# Patient Record
Sex: FEMALE | Race: WHITE | NOT HISPANIC OR LATINO | Employment: UNEMPLOYED | URBAN - METROPOLITAN AREA
[De-identification: names, ages, dates, MRNs, and addresses within clinical notes are randomized per-mention and may not be internally consistent; named-entity substitution may affect disease eponyms.]

---

## 2021-12-17 DIAGNOSIS — N90.89 VULVAR LESION: Primary | ICD-10-CM

## 2022-01-03 ENCOUNTER — OFFICE VISIT (OUTPATIENT)
Dept: OBSTETRICS AND GYNECOLOGY | Facility: CLINIC | Age: 67
End: 2022-01-03
Payer: MEDICARE

## 2022-01-03 VITALS
WEIGHT: 198.19 LBS | BODY MASS INDEX: 30.04 KG/M2 | SYSTOLIC BLOOD PRESSURE: 106 MMHG | HEART RATE: 98 BPM | HEIGHT: 68 IN | DIASTOLIC BLOOD PRESSURE: 69 MMHG

## 2022-01-03 DIAGNOSIS — L72.3 SEBACEOUS CYST: Primary | ICD-10-CM

## 2022-01-03 DIAGNOSIS — N90.89 VULVAR LESION: ICD-10-CM

## 2022-01-03 PROCEDURE — 99203 PR OFFICE/OUTPT VISIT, NEW, LEVL III, 30-44 MIN: ICD-10-PCS | Mod: 25,S$GLB,, | Performed by: NURSE PRACTITIONER

## 2022-01-03 PROCEDURE — 99203 OFFICE O/P NEW LOW 30 MIN: CPT | Mod: 25,S$GLB,, | Performed by: NURSE PRACTITIONER

## 2022-01-03 PROCEDURE — 10060 INCISION AND DRAINAGE: ICD-10-PCS | Mod: S$GLB,,, | Performed by: NURSE PRACTITIONER

## 2022-01-03 PROCEDURE — 10060 I&D ABSCESS SIMPLE/SINGLE: CPT | Mod: S$GLB,,, | Performed by: NURSE PRACTITIONER

## 2022-01-03 RX ORDER — METOPROLOL SUCCINATE 50 MG/1
50 TABLET, EXTENDED RELEASE ORAL DAILY
COMMUNITY
Start: 2021-11-18

## 2022-01-03 RX ORDER — HYDROCHLOROTHIAZIDE 25 MG/1
25 TABLET ORAL DAILY
COMMUNITY
Start: 2021-12-13

## 2022-01-03 RX ORDER — PANTOPRAZOLE SODIUM 40 MG/1
40 TABLET, DELAYED RELEASE ORAL EVERY MORNING
COMMUNITY
Start: 2021-12-28

## 2022-01-03 RX ORDER — CLOMIPRAMINE HYDROCHLORIDE 25 MG/1
25 CAPSULE ORAL DAILY
COMMUNITY
Start: 2021-11-29

## 2022-01-03 RX ORDER — LORAZEPAM 1 MG/1
1 TABLET ORAL 3 TIMES DAILY PRN
COMMUNITY
Start: 2021-12-09

## 2022-01-03 RX ORDER — LOSARTAN POTASSIUM 100 MG/1
100 TABLET ORAL DAILY
COMMUNITY
Start: 2021-11-05

## 2022-01-03 RX ORDER — CEPHALEXIN 500 MG/1
CAPSULE ORAL
COMMUNITY
Start: 2021-12-13

## 2022-01-03 RX ORDER — DIPHENOXYLATE HYDROCHLORIDE AND ATROPINE SULFATE 2.5; .025 MG/1; MG/1
TABLET ORAL
COMMUNITY
Start: 2021-11-19

## 2022-01-03 RX ORDER — ESTRADIOL CYPIONATE 5 MG/ML
INJECTION INTRAMUSCULAR
COMMUNITY
Start: 2021-10-11

## 2022-01-03 RX ORDER — ATORVASTATIN CALCIUM 20 MG/1
TABLET, FILM COATED ORAL
COMMUNITY
Start: 2021-10-05

## 2022-01-03 NOTE — PROGRESS NOTES
"  Subjective:       Patient ID: Temitope Deluna is a 66 y.o. female.    Chief Complaint:  Cyst      History of Present Illness   Presents as a referral from PCP for cyst on vaginal lesion. Patient states "bump" on vagina for the last few years. States non painful and doesn't interfere with intercourse.   No LMP recorded. Patient has had a hysterectomy.      OB History        0    Para   0    Term   0       0    AB   0    Living   0       SAB   0    IAB   0    Ectopic   0    Multiple   0    Live Births   0                  Review of Systems  Review of Systems   Constitutional: Negative for chills and fever.   Respiratory: Negative for shortness of breath.    Cardiovascular: Negative for chest pain.   Gastrointestinal: Negative for abdominal pain, blood in stool, constipation, diarrhea, nausea, vomiting and reflux.   Genitourinary: Positive for genital sores. Negative for dysmenorrhea, dyspareunia, dysuria, hematuria, hot flashes, menorrhagia, menstrual problem, pelvic pain, vaginal bleeding, vaginal discharge, postcoital bleeding and vaginal dryness.   Musculoskeletal: Negative for arthralgias and joint swelling.   Integumentary:  Negative for rash, hair changes, breast mass, nipple discharge and breast skin changes.   Psychiatric/Behavioral: Negative for depression. The patient is not nervous/anxious.    Breast: Negative for asymmetry, lump, mass, nipple discharge and skin changes          Objective:     Vitals:    22 1343   BP: 106/69   Pulse: 98   Weight: 89.9 kg (198 lb 3.2 oz)   Height: 5' 8" (1.727 m)        Physical Exam:   Constitutional: She is oriented to person, place, and time. She appears well-developed and well-nourished.    HENT:   Head: Normocephalic and atraumatic.    Eyes: Pupils are equal, round, and reactive to light. Conjunctivae are normal.    Neck: No thyromegaly present.     Pulmonary/Chest: Effort normal and breath sounds normal.        Abdominal: Soft.     Genitourinary:    " Inguinal canal, vagina, uterus and rectum normal.   Rectum:      Guaiac result negative.   Guaiac negative stool. The external female genitalia was normal.   Genitalia hair distrobution normal .   Labial bartholins normal.No  no vaginal discharge in the vagina.    Genitourinary Comments: Sebaceous cyst noted to right labia             Musculoskeletal: Normal range of motion and moves all extremeties.       Neurological: She is alert and oriented to person, place, and time. She has normal reflexes.    Skin: Skin is warm and dry.    Psychiatric: She has a normal mood and affect. Her behavior is normal. Judgment and thought content normal.          Assessment:     1. Sebaceous cyst    2. Vulvar lesion              Plan:       Sebaceous cyst  -     Specimen to Pathology  -     INCISION AND DRAINAGE    Vulvar lesion  -     Ambulatory referral/consult to Obstetrics / Gynecology       Keep area clean and dry  KA with PCP  Follow up if symptoms worsen or fail to improve.

## 2022-01-03 NOTE — PROCEDURES
"INCISION AND DRAINAGE    Date/Time: 1/3/2022 2:30 PM  Performed by: Justine Yadav NP  Authorized by: Justine Yadav NP     Time out: Immediately prior to procedure a "time out" was called to verify the correct patient, procedure, equipment, support staff and site/side marked as required.    Consent Done?:  Yes (Verbal)    Type:  Cyst  Body area:  Anogenital  Location details:  Vulva  Anesthesia:  Local infiltration  Local anesthetic: lidocaine 2% with epinephrine  Scalpel size:  11  Incision type:  Single straight  Incision depth: dermal    Complexity:  Simple  Drainage:  Viscous  Drainage amount:  Moderate  Wound treatment:  Sebum removal  Patient tolerance:  Patient tolerated the procedure well with no immediate complications    sebaceous cyst to right  labia-will send to pathology      "

## 2022-01-13 ENCOUNTER — TELEPHONE (OUTPATIENT)
Dept: OBSTETRICS AND GYNECOLOGY | Facility: CLINIC | Age: 67
End: 2022-01-13
Payer: MEDICARE

## 2022-05-24 DIAGNOSIS — M77.9 INFLAMMATION AROUND JOINT: Primary | ICD-10-CM

## 2022-05-24 RX ORDER — MELOXICAM 15 MG/1
15 TABLET ORAL DAILY
Qty: 30 TABLET | Refills: 1 | Status: SHIPPED | OUTPATIENT
Start: 2022-05-24

## 2022-05-24 RX ORDER — MELOXICAM 15 MG/1
15 TABLET ORAL DAILY
COMMUNITY
End: 2022-05-24 | Stop reason: SDUPTHER